# Patient Record
Sex: FEMALE | Race: WHITE | HISPANIC OR LATINO | Employment: OTHER
[De-identification: names, ages, dates, MRNs, and addresses within clinical notes are randomized per-mention and may not be internally consistent; named-entity substitution may affect disease eponyms.]

---

## 2019-03-04 NOTE — PATIENT DISCUSSION
Mac OCT prn if vision changes or upon Dr's request.RTC in 1 year for Exam, sooner if problems or changes occur.

## 2020-03-06 NOTE — PATIENT DISCUSSION
Monitor for changes. Advised Pt of condition of cataracts and option of CE vs upadting GLRx. Discussed symptoms of worsening and becoming more bothersome. Pt states may want to consider CE in the Fall. Call if vision changes or becomes more bothered before next visit.

## 2021-03-22 NOTE — PATIENT DISCUSSION
Monitor. Explained VALERIE and recommended regular use of artificial tears 3-4 times per day and increase prn.

## 2021-03-22 NOTE — PATIENT DISCUSSION
Monitor for changes. Advised Pt of condition of cataracts and option of CE to improve visual function once becomes ready. Discussed symptoms of worsening and becoming more bothersome. Pt states may want to consider CE when returns for next season; going [place] for the Summer. Call if vision changes or becomes more bothered before next visit.

## 2021-08-30 ENCOUNTER — NEW PATIENT COMPREHENSIVE (OUTPATIENT)
Age: 66
End: 2021-08-30

## 2021-08-30 DIAGNOSIS — H25.13: ICD-10-CM

## 2021-08-30 PROCEDURE — 92015 DETERMINE REFRACTIVE STATE: CPT

## 2021-08-30 PROCEDURE — 92004 COMPRE OPH EXAM NEW PT 1/>: CPT

## 2021-08-30 ASSESSMENT — VISUAL ACUITY
OS_SC: 20/150
OD_SC: 20/150
OS_CC: 20/30
OD_CC: 20/30

## 2021-08-30 ASSESSMENT — TONOMETRY
OS_IOP_MMHG: 17
OD_IOP_MMHG: 17

## 2022-03-23 NOTE — PATIENT DISCUSSION
Monitor for changes. Advised Pt of condition of cataracts and option of CE to improve visual function once becomes more bothersome. Discussed symptoms of worsening and becoming more bothersome. Call if vision changes or becomes more bothered before next visit.